# Patient Record
Sex: MALE | Race: ASIAN | NOT HISPANIC OR LATINO | URBAN - METROPOLITAN AREA
[De-identification: names, ages, dates, MRNs, and addresses within clinical notes are randomized per-mention and may not be internally consistent; named-entity substitution may affect disease eponyms.]

---

## 2022-08-08 ENCOUNTER — EMERGENCY (EMERGENCY)
Facility: HOSPITAL | Age: 22
LOS: 1 days | Discharge: HOME | End: 2022-08-08
Attending: EMERGENCY MEDICINE | Admitting: EMERGENCY MEDICINE

## 2022-08-08 VITALS
RESPIRATION RATE: 17 BRPM | DIASTOLIC BLOOD PRESSURE: 88 MMHG | SYSTOLIC BLOOD PRESSURE: 133 MMHG | OXYGEN SATURATION: 100 % | TEMPERATURE: 98 F | HEART RATE: 106 BPM

## 2022-08-08 PROCEDURE — 99284 EMERGENCY DEPT VISIT MOD MDM: CPT

## 2022-08-08 PROCEDURE — 73552 X-RAY EXAM OF FEMUR 2/>: CPT | Mod: 26,LT

## 2022-08-08 PROCEDURE — 73502 X-RAY EXAM HIP UNI 2-3 VIEWS: CPT | Mod: 26,LT

## 2022-08-08 RX ORDER — OXYCODONE AND ACETAMINOPHEN 5; 325 MG/1; MG/1
1 TABLET ORAL ONCE
Refills: 0 | Status: DISCONTINUED | OUTPATIENT
Start: 2022-08-08 | End: 2022-08-08

## 2022-08-08 RX ORDER — KETOROLAC TROMETHAMINE 30 MG/ML
30 SYRINGE (ML) INJECTION ONCE
Refills: 0 | Status: DISCONTINUED | OUTPATIENT
Start: 2022-08-08 | End: 2022-08-08

## 2022-08-08 RX ORDER — DIAZEPAM 5 MG
5 TABLET ORAL ONCE
Refills: 0 | Status: DISCONTINUED | OUTPATIENT
Start: 2022-08-08 | End: 2022-08-08

## 2022-08-08 RX ADMIN — Medication 5 MILLIGRAM(S): at 22:08

## 2022-08-08 RX ADMIN — OXYCODONE AND ACETAMINOPHEN 1 TABLET(S): 5; 325 TABLET ORAL at 22:08

## 2022-08-08 RX ADMIN — Medication 30 MILLIGRAM(S): at 22:08

## 2022-08-08 NOTE — ED PROVIDER NOTE - PROGRESS NOTE DETAILS
ARPITA FRAUSTO:  pt reassessed, symptoms improved.  ambulatory, eager for discharge.  updated on unremarkable results.  will follow up outpatient with PCP in 1-2 days.  given outpatient neurosurgery for further evaluation.  return precautions discussed for numbness, tingling, bowel/bladder incontinence, saddle anesthesia, fevers, or inability to ambulate.  rx for ibuprofen/ flexeril given. advised not to drink etoh/drive when taking.

## 2022-08-08 NOTE — ED PROVIDER NOTE - ATTENDING APP SHARED VISIT CONTRIBUTION OF CARE
I performed the initial face to face bedside interview with this patient regarding history of present illness, review of symptoms and past medical, social and family history.  I completed an independent physical examination.  I was the initial provider who evaluated this patient.  The history, review of symptoms and examination was documented by me.  I have signed out the follow up of any pending tests (i.e. labs, radiological studies) to the PA.  I have discussed the patient’s plan of care and disposition with the PA.

## 2022-08-08 NOTE — ED PROVIDER NOTE - PATIENT PORTAL LINK FT
You can access the FollowMyHealth Patient Portal offered by [facility] by registering at the following website: [email or web address]. By joining [employer]’s FollowMyHealth portal, you will also be able to view your health information using other applications (apps) compatible with our system.

## 2022-08-08 NOTE — ED PROVIDER NOTE - NSFOLLOWUPINSTRUCTIONS_ED_ALL_ED_FT
you were seen in our department for back pain.  take ibuprofen 600mg with food every 6-8 hours as needed for pain.  take flexeril 10mg nightly as needed for pain/muscle relaxant. do not drink alcohol or drive when taking this medication.  Follow up with your primary care doctor as well as spine specialist asap.   return to ED if you develop numbness, tingling, bowel/bladder incontinence, fevers, inability to walk, dizziness, chest pain or shortness of breath.

## 2022-08-08 NOTE — ED ADULT NURSE NOTE - OBJECTIVE STATEMENT
Pt A&Ox4 ambulatory, presenting to the ED (Intake 10B) c/o left lower back pain that radiated to LLE x 1 month. Pt states was walking when sudden pain started, states has been having difficulty ambulating, denies any falls or injuries denies any urinary symptoms, Denies cp, sob, palpitations, dizziness, lightheadedness, n/v/d, fever, chills, cough, HA, blurry vision. Respirations are even and unlabored, medicated as orders, awaiting X-rays, Safety precautions implemented as per protocol, awaiting further MD orders, will continue to monitor.

## 2022-08-08 NOTE — ED PROVIDER NOTE - NS ED ATTENDING STATEMENT MOD
Attending Only This was a shared visit with the EDGARDO. I reviewed and verified the documentation and independently performed the documented:
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
## 2022-08-09 VITALS
SYSTOLIC BLOOD PRESSURE: 127 MMHG | OXYGEN SATURATION: 100 % | HEART RATE: 87 BPM | TEMPERATURE: 99 F | DIASTOLIC BLOOD PRESSURE: 73 MMHG | RESPIRATION RATE: 18 BRPM

## 2022-08-09 RX ORDER — IBUPROFEN 200 MG
1 TABLET ORAL
Qty: 15 | Refills: 0
Start: 2022-08-09 | End: 2022-08-13

## 2022-08-09 RX ORDER — CYCLOBENZAPRINE HYDROCHLORIDE 10 MG/1
1 TABLET, FILM COATED ORAL
Qty: 5 | Refills: 0
Start: 2022-08-09 | End: 2022-08-13

## 2024-11-10 ENCOUNTER — EMERGENCY (EMERGENCY)
Facility: HOSPITAL | Age: 24
LOS: 1 days | Discharge: HOME | End: 2024-11-10
Attending: EMERGENCY MEDICINE | Admitting: EMERGENCY MEDICINE
Payer: COMMERCIAL

## 2024-11-10 VITALS
WEIGHT: 205.03 LBS | HEIGHT: 71 IN | RESPIRATION RATE: 16 BRPM | TEMPERATURE: 98 F | OXYGEN SATURATION: 98 % | DIASTOLIC BLOOD PRESSURE: 78 MMHG | SYSTOLIC BLOOD PRESSURE: 130 MMHG | HEART RATE: 89 BPM

## 2024-11-10 PROCEDURE — 73562 X-RAY EXAM OF KNEE 3: CPT | Mod: 26,LT

## 2024-11-10 PROCEDURE — 99284 EMERGENCY DEPT VISIT MOD MDM: CPT

## 2024-11-10 PROCEDURE — 72040 X-RAY EXAM NECK SPINE 2-3 VW: CPT

## 2024-11-10 PROCEDURE — 72040 X-RAY EXAM NECK SPINE 2-3 VW: CPT | Mod: 26

## 2024-11-10 PROCEDURE — 73562 X-RAY EXAM OF KNEE 3: CPT

## 2024-11-10 RX ORDER — IBUPROFEN 200 MG
600 TABLET ORAL ONCE
Refills: 0 | Status: COMPLETED | OUTPATIENT
Start: 2024-11-10 | End: 2024-11-10

## 2024-11-10 RX ORDER — LIDOCAINE HYDROCHLORIDE 40 MG/ML
1 SOLUTION TOPICAL ONCE
Refills: 0 | Status: COMPLETED | OUTPATIENT
Start: 2024-11-10 | End: 2024-11-10

## 2024-11-10 RX ADMIN — LIDOCAINE HYDROCHLORIDE 1 PATCH: 40 SOLUTION TOPICAL at 18:09

## 2024-11-10 RX ADMIN — Medication 600 MILLIGRAM(S): at 18:33

## 2024-11-10 RX ADMIN — Medication 600 MILLIGRAM(S): at 18:10

## 2024-11-10 NOTE — ED ADULT NURSE NOTE - BREATH SOUNDS, LEFT
clear O-T Advancement Flap Text: The defect edges were debeveled with a #15 scalpel blade.  Given the location of the defect, shape of the defect and the proximity to free margins an O-T advancement flap was deemed most appropriate.  Using a sterile surgical marker, an appropriate advancement flap was drawn incorporating the defect and placing the expected incisions within the relaxed skin tension lines where possible.    The area thus outlined was incised deep to adipose tissue with a #15 scalpel blade.  The skin margins were undermined to an appropriate distance in all directions utilizing iris scissors.
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
## 2024-11-10 NOTE — ED ADULT NURSE NOTE - NEURO SENSATION
Physical Therapy Discharge    Visit Type: Discharge Summary  Visit: 5  Referring Provider: Delfin Johnson CNP  Medical Diagnosis (from order): M54.50 - Acute right-sided low back pain without sciatica  M54.42, G89.29 - Chronic midline low back pain with left-sided sciatica  R20.0 - Numbness  R20.2 - Paresthesia  M54.16 - Lumbar radiculopathy  M54.12 - Cervical radiculopathy  M51.26 - Displacement of lumbar intervertebral disc without myelopathy  M50.30 - Bulging of cervical intervertebral disc  M54.2 - Neck pain     SUBJECTIVE                                                                                                               5/5 visit    Patient reports left lower back pain 4/10. States not being compliant with HEP due to pain and thinks therapy is not helping.   Functional Change: Slight improvement with functional mobility  Current Functional Limitations: Cont to have extreme pain with all ADLs and has difficulty with prolonged sitting, standing, and walking secondary to pain.     Pain / Symptoms  - Pain rating (out of 10): Current: 4       OBJECTIVE                                                                                                                     Range of Motion (ROM)   (degrees unless noted; active unless noted; norms in ( ); negative=lacking to 0, positive=beyond 0)  Cervical:    - Extension (45-60): 35°    - Rotation (60-80):         Left: 25°  Lumbar:    - Extension (25):  12°  pain     - Rotation (30-45):         Left:  17°  pain     - Side Bend (25-35):         Left:  14°  pain          Right:  15°  pain     Strength  (out of 5 unless noted, standard test position unless noted)   Hip:    - Flexion:         Left: 4, pain  Knee:    - Flexion:         Left: 4+         Right: 5    - Extension:         Left: 4         Right: 5             Balance Tests   5 Times Sit to Stand (seconds) 47.93    Interpretation:        > 12 seconds indicates need for further assessment for fall risk for  community dwelling elderly      > 16 seconds indicative of falls risk for Parkinson's disease  10 Meter Walk Test - Comfortable Pace      - Trial 1: 12.31 sec, Trial 2: 13.81 sec     - 1 Trial: 0.49 M/sec            Outcome/Assessments  Outcome Measures:   OSWESTRY Total Scored: 24  OSWESTRY Total Possible Score: 45  OSWESTRY Score Calculated: 53.33 %  (0-20% = minimal disability; 20-40% = moderate disability; 40-60% = severe disability; 60-80% = crippled; % = bed bound) see flowsheet for additional documentation        Treatment     Therapeutic Exercise  Seated elliptical 8' L3     Therapeutic Activity  Test and measures     Home Exercise Program  Access Code: [email or web address]  URL: [email or web address]  Date: 11/02/2024  Prepared by: Nicole David    Exercises  - Seated Cervical Sidebending Stretch  - 1 x daily - 7 x weekly - 3 sets - 10 reps  - Seated Passive Cervical Retraction  - 1 x daily - 7 x weekly - 3 sets - 10 reps  - Seated Assisted Cervical Rotation with Towel  - 1 x daily - 7 x weekly - 3 sets - 10 reps  - Seated Levator Scapulae Stretch  - 1 x daily - 7 x weekly - 3 sets - 10 reps  - Seated Cervical Extension AROM  - 1 x daily - 7 x weekly - 3 sets - 10 reps  - Supine Lower Trunk Rotation  - 1 x daily - 7 x weekly - 3 sets - 10 reps  - Seated Lumbar Flexion Stretch  - 1 x daily - 7 x weekly - 3 sets - 10 reps  - Sit to Stand Without Arm Support  - 1 x daily - 7 x weekly - 3 sets - 10 reps  - Standing Lumbar Extension with Counter  - 1 x daily - 7 x weekly - 3 sets - 10 reps  - TL Sidebending Stretch - Single Arm Overhead  - 1 x daily - 7 x weekly - 3 sets - 10 reps  - Bent Knee Fallouts  - 1 x daily - 7 x weekly - 3 sets - 10 reps  - Supine Bridge with Resistance Band  - 1 x daily - 7 x weekly - 3 sets - 10 reps  - Hooklying Isometric Clamshell  - 1 x daily - 7 x weekly - 3 sets - 10 reps  - Clamshell with Resistance  - 1 x daily - 7 x weekly - 3 sets - 10 reps  - Side  Stepping with Resistance at Ankles  - 1 x daily - 7 x weekly - 3 sets - 10 reps  - Hip Extension with Resistance Loop  - 1 x daily - 7 x weekly - 3 sets - 10 reps  - Seated Hip Abduction with Resistance  - 1 x daily - 7 x weekly - 3 sets - 10 reps  - Seated Knee Extension with Resistance  - 1 x daily - 7 x weekly - 3 sets - 10 reps      ASSESSMENT                                                                                                            Gains in skilled therapy to date not as expected due to complains of pain in lower back and neck.  Patient challenged with recommended HEP due to pain.  Progress toward discharge/long term goals (see below for specific status updates): poor progress due to complains of pain.    Patient has attended 5  PT visits to address neck pain and low back pain, weakness, decreased ROM, and impaired functional mobility. Patient has slightly improved OSWESTRY to 53.33%, 10 meter walk test to .49M/sec, however has decreased 5xSTS to 47.93. Patient has decreased neck and back ROM in all planes and cont to complaint of pain. Patient has slightly improved left knee MMT as listed above. Patient has not met any long term goals and has reached maximum benefit for PT, therefore patient to be discharged today. Upgraded HEP and theraband was handed to patient. Patient understands the importance to cont with HEP to maintain gain and cont to progress. Patient had no further question upon DC.       PLAN                                                                                                                           Discharge from skilled therapy with instructions/recommendations to: continue home exercise program    Goals  Long Term Goals: to be met by end of plan of care  1. Patient will be able to perform lumbar active range of motion WNL and no pain to allow for household chores. Status: not met All measurement listed above.   2. Patient will improve oswestry score to 20% or better  to demonstrate improved function.  Status: not metScored 53.33% only 5% more   3. Patient will decrease pain from 6 to 3 in order to be able to complete ADLs and go to work without pain. Status: not met Patient reports pain 4/10  4. Patient will bend/squat 10 repetitions for tasks for independent living such as lifting items floor to waist/ 12\" to waist.  Status: not met Patient only able to do one repetition and complains of pain.   5. The patient will improve hip flexion and abduction, knee flexion and extension strength to greater than or equal to +4/5 in order to improve functional abilities prolonged standing, walking and floor transfers. Status: partially met  All measurements listed above.   6. Patient will be independent with progressed and modified home exercise program. Status: not met Not compliant with HEP       Therapy procedure time and total treatment time can be found documented on the Time Entry flowsheet     sensory intact

## 2024-11-10 NOTE — ED PROVIDER NOTE - CARE PROVIDER_API CALL
Donald Bailey  Orthopaedic Surgery  [address]  [place] [address]  Phone: [phone]  Fax: [phone]  Follow Up Time: 1-3 Days
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
## 2024-11-10 NOTE — ED PROVIDER NOTE - WR ORDER DATE AND TIME 1
Routing refill request to provider for review/approval because:  Labs not current:  lipids         10-Nov-2024 17:33

## 2024-11-10 NOTE — ED PROVIDER NOTE - PROGRESS NOTE DETAILS
Patient stable.  X-ray results discussed with patient.  No evidence of acute fracture.  General soft tissue instructions discussed.  Recommend to continue Tylenol or  Motrin over-the-counter for pain discomfort.  Referral to orthopedics provided if pain continues or fails to improve

## 2024-11-10 NOTE — ED PROVIDER NOTE - MUSCULOSKELETAL, MLM
no vert tenderness. soft tissue tenderness to left upper trap. mild left knee tenderness. full ROM. no deformities. no laxity appreciated. able to SLR. patellar tendon intact

## 2024-11-10 NOTE — ED PROVIDER NOTE - DIFFERENTIAL DIAGNOSIS
Differentials include but not limited to sprain, strain, fracture.  Denies hitting head or LOC.  Do not suspect intracranial hemorrhage or skull fracture.  No right upper quadrant or left upper quadrant tenderness to suggest injury to liver or spleen Differential Diagnosis
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
## 2024-11-10 NOTE — ED PROVIDER NOTE - PATIENT PORTAL LINK FT
You can access the FollowMyHealth Patient Portal offered by [facility] by registering at the following website: [email or web address]. By joining [facility]’s FollowMyHealth portal, you will also be able to view your health information using other applications (apps) compatible with our system.